# Patient Record
Sex: FEMALE | Race: BLACK OR AFRICAN AMERICAN | URBAN - METROPOLITAN AREA
[De-identification: names, ages, dates, MRNs, and addresses within clinical notes are randomized per-mention and may not be internally consistent; named-entity substitution may affect disease eponyms.]

---

## 2023-12-11 ENCOUNTER — OFFICE VISIT (OUTPATIENT)
Dept: OBSTETRICS AND GYNECOLOGY | Facility: CLINIC | Age: 68
End: 2023-12-11
Payer: MEDICARE

## 2023-12-11 VITALS
BODY MASS INDEX: 28.51 KG/M2 | SYSTOLIC BLOOD PRESSURE: 103 MMHG | WEIGHT: 167 LBS | HEIGHT: 64 IN | DIASTOLIC BLOOD PRESSURE: 60 MMHG

## 2023-12-11 DIAGNOSIS — Z01.419 ENCOUNTER FOR WELL WOMAN EXAM WITH ROUTINE GYNECOLOGICAL EXAM: ICD-10-CM

## 2023-12-11 PROCEDURE — 1159F MED LIST DOCD IN RCRD: CPT | Performed by: OBSTETRICS & GYNECOLOGY

## 2023-12-11 PROCEDURE — 1036F TOBACCO NON-USER: CPT | Performed by: OBSTETRICS & GYNECOLOGY

## 2023-12-11 PROCEDURE — G0101 CA SCREEN;PELVIC/BREAST EXAM: HCPCS | Performed by: OBSTETRICS & GYNECOLOGY

## 2023-12-11 PROCEDURE — 99397 PER PM REEVAL EST PAT 65+ YR: CPT | Performed by: OBSTETRICS & GYNECOLOGY

## 2023-12-11 ASSESSMENT — ENCOUNTER SYMPTOMS
ALLERGIC/IMMUNOLOGIC NEGATIVE: 0
ENDOCRINE NEGATIVE: 0
CONSTITUTIONAL NEGATIVE: 0
RESPIRATORY NEGATIVE: 0
CARDIOVASCULAR NEGATIVE: 0
MUSCULOSKELETAL NEGATIVE: 0
GASTROINTESTINAL NEGATIVE: 0
PSYCHIATRIC NEGATIVE: 0
EYES NEGATIVE: 0
NEUROLOGICAL NEGATIVE: 0
HEMATOLOGIC/LYMPHATIC NEGATIVE: 0

## 2023-12-11 ASSESSMENT — PATIENT HEALTH QUESTIONNAIRE - PHQ9
SUM OF ALL RESPONSES TO PHQ9 QUESTIONS 1 AND 2: 0
2. FEELING DOWN, DEPRESSED OR HOPELESS: NOT AT ALL
1. LITTLE INTEREST OR PLEASURE IN DOING THINGS: NOT AT ALL

## 2023-12-11 NOTE — PROGRESS NOTES
"Jo Morris is a 68 y.o. female who is here for a routine exam. PCP = Quentin Eaton MD  Patient for annual exam.  Complaining of some lower pelvic pain mostly left side.  Started 2 to 3 days ago.  Has progressively gotten better.  At what point was 6-8 out of 10 but now is 1-2 out of 10 for some dull ache.  Has had a prior history.  Had a workup by the emergency room a few months ago and also by GI fairly recently.  No findings patient denies nausea vomiting fever chills no GI or  complaints    Review of Systems  Negative except for some dull achy lower pelvic pain for last 3 days    Physical Exam  Constitutional:       Appearance: Normal appearance. She is obese.   Genitourinary:      Rectum normal.      Genitourinary Comments: External genitalia unremarkable  Vagina clear  Cervix uterus normal  Adnexa unremarkable  Perineum without lesions    Breast without masses tenderness or nipple discharge normal appearance      Uterus is anteverted.   Breasts:     Breasts are soft.     Right: Normal.      Left: Normal.   HENT:      Head: Normocephalic.      Nose: Nose normal.   Eyes:      Pupils: Pupils are equal, round, and reactive to light.   Cardiovascular:      Rate and Rhythm: Normal rate and regular rhythm.   Pulmonary:      Effort: Pulmonary effort is normal.      Breath sounds: Normal breath sounds.   Abdominal:      General: Abdomen is flat. Bowel sounds are normal.      Palpations: Abdomen is soft.   Musculoskeletal:         General: Normal range of motion.      Cervical back: Normal range of motion and neck supple.   Neurological:      General: No focal deficit present.      Mental Status: She is alert.   Skin:     General: Skin is warm and dry.   Psychiatric:         Mood and Affect: Mood normal.         Behavior: Behavior normal.         Thought Content: Thought content normal.         Judgment: Judgment normal.         Objective   /60   Ht 1.626 m (5' 4\")   Wt 75.8 kg (167 lb)   BMI 28.67 kg/m² "   OB History          0    Para   0    Term   0       0    AB   0    Living   0         SAB   0    IAB   0    Ectopic   0    Multiple   0    Live Births   0                  GynHx:  Menarche/Menopause:     Social History     Substance and Sexual Activity   Sexual Activity Yes    Partners: Male    Birth control/protection: None   Sexually active steady partner    STIs: none    Substance:   Tobacco Use: Low Risk  (2023)    Patient History     Smoking Tobacco Use: Never     Smokeless Tobacco Use: Never     Passive Exposure: Not on file      Social History     Substance and Sexual Activity   Drug Use Never      Social History     Substance and Sexual Activity   Alcohol Use Yes    Alcohol/week: 2.0 - 3.0 standard drinks of alcohol    Types: 2 - 3 Glasses of wine per week    Comment: Socially Weekly     Abuse: No  Depression Screen:   Denies any depression symptoms    Past med hx and past surg hx reviewed and notable for: Prior history of pelvic pain which has been worked up with negative findings    Assessment/Plan      Unremarkable GYN exam.  Patient is sexually active steady partner declining STD testing.  Discussed diet exercise calcium and vitamin D.  Discussed mammogram bone density.  Will have it by her primary care physician.  Patient notes she has got a 3-day history of some lower pelvic pain mostly right-sided which has been resolving spontaneously.  Initially 6 out of 8 but now 1-2 out of 10 dull crampy right now.  Has had similar issues in the past with negative workup.  Discussed options for doing pelvic ultrasound.  Patient is okay waiting for a few days to see if pain resolves.  If it does not she will contact me to order x-ray.  May continue workup with GI.  Urinalysis negative today.  Return to office in 1 year or as needed